# Patient Record
(demographics unavailable — no encounter records)

---

## 2025-01-29 NOTE — HISTORY OF PRESENT ILLNESS
[FreeTextEntry1] : A 24-day-old patient presents to the office with a nasal fracture. Imaging has been taken. The patient is experiencing breathing difficulties but has no drainage. No over-the-counter treatments have been used. There is swelling and pain. The patient has not yet seen an ENT specialist.